# Patient Record
Sex: MALE | Race: WHITE | Employment: FULL TIME | URBAN - METROPOLITAN AREA
[De-identification: names, ages, dates, MRNs, and addresses within clinical notes are randomized per-mention and may not be internally consistent; named-entity substitution may affect disease eponyms.]

---

## 2018-09-21 PROBLEM — Z99.89 OSA ON CPAP: Status: ACTIVE | Noted: 2018-09-21

## 2018-09-21 PROBLEM — G47.33 OSA ON CPAP: Status: ACTIVE | Noted: 2018-09-21

## 2018-10-15 ENCOUNTER — OFFICE VISIT (OUTPATIENT)
Dept: FAMILY MEDICINE CLINIC | Age: 53
End: 2018-10-15
Payer: COMMERCIAL

## 2018-10-15 VITALS
HEIGHT: 71 IN | WEIGHT: 302 LBS | RESPIRATION RATE: 14 BRPM | SYSTOLIC BLOOD PRESSURE: 136 MMHG | OXYGEN SATURATION: 98 % | DIASTOLIC BLOOD PRESSURE: 78 MMHG | BODY MASS INDEX: 42.28 KG/M2 | HEART RATE: 72 BPM

## 2018-10-15 DIAGNOSIS — M25.512 ACUTE PAIN OF LEFT SHOULDER: Primary | ICD-10-CM

## 2018-10-15 PROCEDURE — 99202 OFFICE O/P NEW SF 15 MIN: CPT | Performed by: NURSE PRACTITIONER

## 2018-10-15 ASSESSMENT — PATIENT HEALTH QUESTIONNAIRE - PHQ9
SUM OF ALL RESPONSES TO PHQ9 QUESTIONS 1 & 2: 0
SUM OF ALL RESPONSES TO PHQ QUESTIONS 1-9: 0
SUM OF ALL RESPONSES TO PHQ QUESTIONS 1-9: 0
1. LITTLE INTEREST OR PLEASURE IN DOING THINGS: 0
2. FEELING DOWN, DEPRESSED OR HOPELESS: 0

## 2018-10-15 NOTE — PROGRESS NOTES
Oval Hugh   52 y.o.  male  [id number]      Chief Complaint   Patient presents with    Shoulder Pain     L; \"should popped out while doing yard work, popped itself back in\"        Subjective:  52 y. o.male is here for a follow up. He has the following chronic/acute medical problems:  Patient Active Problem List   Diagnosis    VENITA on CPAP     Patient is here today with left shoulder pain. Patient states he was working around the house. He took a branch and through it in a pile and his shoulder went out of socket. He states the shoulder went back in the socket but he is not sure if it went back in all the way. He is still having pain and was told to come here to get an ortho referral.           Review of Systems   Constitutional: Negative for appetite change, chills, fatigue and fever. HENT: Negative. Respiratory: Negative. Cardiovascular: Negative for chest pain and palpitations. Gastrointestinal: Negative for diarrhea, nausea and vomiting. Skin: Negative for rash. Neurological: Negative for dizziness, light-headedness and headaches. Current Outpatient Prescriptions   Medication Sig Dispense Refill    fexofenadine (ALLEGRA) 180 MG tablet Take by mouth      Budesonide-Formoterol Fumarate (SYMBICORT IN) Inhale into the lungs      Dextromethorphan-Guaifenesin (MUCINEX DM PO) Take  by mouth.  Dextromethorphan-Guaifenesin (GUAIFENESIN DM PO) Take  by mouth.  Fluticasone-Salmeterol (ADVAIR DISKUS IN) Inhale  into the lungs.  losartan (COZAAR) 100 MG tablet Take 100 mg by mouth daily.  fenofibrate 160 MG tablet Take 160 mg by mouth daily.  montelukast (SINGULAIR) 10 MG tablet Take 10 mg by mouth nightly.  terazosin (HYTRIN) 10 MG capsule Take 10 mg by mouth nightly.  albuterol (PROVENTIL HFA) 108 (90 BASE) MCG/ACT inhaler Inhale 2 puffs into the lungs every 6 hours as needed for Wheezing. 1 Inhaler 0     No current facility-administered medications for this visit.

## 2018-10-16 ASSESSMENT — ENCOUNTER SYMPTOMS
NAUSEA: 0
VOMITING: 0
RESPIRATORY NEGATIVE: 1
DIARRHEA: 0

## 2018-10-22 ENCOUNTER — OFFICE VISIT (OUTPATIENT)
Dept: ORTHOPEDIC SURGERY | Age: 53
End: 2018-10-22

## 2018-10-22 VITALS — WEIGHT: 297 LBS | HEIGHT: 71 IN | BODY MASS INDEX: 41.58 KG/M2 | RESPIRATION RATE: 16 BRPM

## 2018-10-22 DIAGNOSIS — S46.012A ROTATOR CUFF STRAIN, LEFT, INITIAL ENCOUNTER: Primary | ICD-10-CM

## 2018-10-22 DIAGNOSIS — R52 PAIN: ICD-10-CM

## 2018-10-22 PROCEDURE — 99203 OFFICE O/P NEW LOW 30 MIN: CPT | Performed by: PHYSICIAN ASSISTANT

## 2018-10-22 ASSESSMENT — ENCOUNTER SYMPTOMS: BACK PAIN: 1

## 2018-10-22 NOTE — PATIENT INSTRUCTIONS
Physical Therapy for rotator cuff strain   Naproxen and Tylenol   OK to return to work   Follow up with persistent pain

## 2018-10-22 NOTE — PROGRESS NOTES
Review of Systems   Respiratory: Negative for chest tightness. Cardiovascular: Negative for chest pain. Musculoskeletal: Positive for arthralgias, back pain and myalgias. Negative for neck pain and neck stiffness. Neurological: Negative for numbness. All other systems reviewed and are negative. Zenaida Colmenares is a 52y.o. year old male who complains of aching, sharp Left Shoulder pain that he rates at a 6/10. He states that he was doing yard work and pulling branches down and felt a sharp pull in his left shoulder in the biceps area and had immediate pain. Initially he was not able to move the shoulder very much without extreme pain however it is improving. He does continue to have some pain in the anterior biceps and bicipital groove. Lifting overhead or twisting the arm increases his pain, time and rest has improved his pain. He has never had an injury to this shoulder in the past and has never had surgery on the shoulder in the past.  He has taken some Tylenol for the pain. He has injured his right arm in the past and had a biceps tendon repair on the distal biceps. Past Medical History:   Diagnosis Date    Chronic bronchitis ([address]        Past Surgical History:   Procedure Laterality Date    BICEPS TENDON REPAIR         No family history on file.     Social History     Social History    Marital status:      Spouse name: N/A    Number of children: N/A    Years of education: N/A     Social History Main Topics    Smoking status: Never Smoker    Smokeless tobacco: Never Used    Alcohol use No    Drug use: No    Sexual activity: Not Asked     Other Topics Concern    None     Social History Narrative    None       Current Outpatient Prescriptions   Medication Sig Dispense Refill    fexofenadine (ALLEGRA) 180 MG tablet Take by mouth      Budesonide-Formoterol Fumarate (SYMBICORT IN) Inhale into the lungs      Dextromethorphan-Guaifenesin (MUCINEX DM PO) Take  by

## 2018-10-26 ASSESSMENT — ENCOUNTER SYMPTOMS: CHEST TIGHTNESS: 0

## 2018-11-06 ENCOUNTER — HOSPITAL ENCOUNTER (OUTPATIENT)
Dept: PHYSICAL THERAPY | Age: 53
Setting detail: THERAPIES SERIES
Discharge: HOME | End: 2018-11-06
Payer: COMMERCIAL

## 2018-11-06 PROCEDURE — 97110 THERAPEUTIC EXERCISES: CPT

## 2018-11-06 PROCEDURE — G8984 CARRY CURRENT STATUS: HCPCS

## 2018-11-06 PROCEDURE — 97161 PT EVAL LOW COMPLEX 20 MIN: CPT

## 2018-11-06 PROCEDURE — G8985 CARRY GOAL STATUS: HCPCS

## 2018-11-06 ASSESSMENT — PAIN DESCRIPTION - PAIN TYPE: TYPE: ACUTE PAIN

## 2018-11-06 ASSESSMENT — PAIN DESCRIPTION - ONSET: ONSET: SUDDEN

## 2018-11-06 ASSESSMENT — PAIN DESCRIPTION - LOCATION: LOCATION: SHOULDER

## 2018-11-06 ASSESSMENT — PAIN DESCRIPTION - DESCRIPTORS: DESCRIPTORS: DULL;THROBBING

## 2018-11-06 ASSESSMENT — PAIN SCALES - GENERAL: PAINLEVEL_OUTOF10: 6

## 2018-11-06 ASSESSMENT — PAIN DESCRIPTION - FREQUENCY: FREQUENCY: CONTINUOUS

## 2018-11-06 ASSESSMENT — PAIN DESCRIPTION - ORIENTATION: ORIENTATION: LEFT;ANTERIOR

## 2018-11-06 ASSESSMENT — PAIN DESCRIPTION - PROGRESSION: CLINICAL_PROGRESSION: GRADUALLY IMPROVING

## 2018-11-06 NOTE — PLAN OF CARE
Signature:________________________________Date:_________ TIME: _____  By signing above, therapists plan is approved by physician

## 2018-11-06 NOTE — FLOWSHEET NOTE
Outpatient Physical Therapy           [place]           [x] Phone: [phone]   Fax: [phone]  [facility]           [] Phone: [phone]   Fax: [phone]    Physical Therapy Daily Treatment Note  Date:  2018    Patient Name:  Scotty De    :  1965  MRN: [id number]  Restrictions/Precautions: Other position/activity restrictions: None  Diagnosis:   Diagnosis: RTC Strain  Date of Surgery:   Treatment Diagnosis:  L shoulder pain and decreased ROM    Insurance/Certification information: Drew   Referring Physician:  Deysi Burgess  Next Doctor Visit:    Plan of care signed (Y/N):   N  Visit# / total visits:     Pain level: /10   Goals:          Long term goals  Time Frame for Long term goals : 6 weeks  Long term goal 1: Pt will report no feelings of instability in the past 6 weeks  Long term goal 2: Pt will improve Quick DASH score to </=15%  Long term goal 3: Pt will improve L abd and IR AROM to 145 and T10, respectively  Long term goal 4: Pt will be able to pull 30 lbs straight down to simulate pulling a wire at work         Subjective:  See eval       Any changes in Ambulatory Summary Sheet? See eval      Objective:  See eval     Exercises:  Exercise/Equipment 18 Date Date Date            Warm-up                       TABLE         TE         SP 3x15 4#      Concentric circles 20x ea 4#               NR         Rhythmic stabilization                          STANDING         TE         Cane abd 2x10      Isometrics flx/ext/abd/IR/ER 10x ea 5\" hold      Biceps eccentric       IR/ER/ext with TB                TA                                NR                                Other Therapeutic Activities/Education:  Patient received education on their current pathology and how their condition effects them with their functional activities. Patient understood discussion and questions were answered.  Patient understands their activity limitations and understands rational for treatment

## 2018-11-06 NOTE — PLAN OF CARE
Excellent [] Good [x] Fair  [] Poor     Goals:      Long term goals  Time Frame for Long term goals : 6 weeks  Long term goal 1: Pt will report no feelings of instability in the past 6 weeks  Long term goal 2: Pt will improve Quick DASH score to </=15%  Long term goal 3: Pt will improve L abd and IR AROM to 145 and T10, respectively  Long term goal 4: Pt will be able to pull 30 lbs straight down to simulate pulling a wire at work    G-Code Selection: (On Eval and every 10th visit or Discharge)  MEASURE  [] Mobility: Walking and Moving Around     [] Current ()   [] Goal ()   [] DC ()  [] Changing/Maintaining Body Position     [] Current (8981)      [] Goal ()   [] DC ()  [x] Carrying / Moving / Handling Objects     [x] Current ()   [x] Goal ()   [] DC ()  [] Self-Care     [] Current ()   [] Goal ()   [] DC ()  [] Other PT/OT primary DX     [] Current ()   [] Goal ()   [] DC ()    SEVERITY  CURRENT  GOAL  DISCHARGE   [] CH (0% Impaired, Indep.)  [] CI (1-19% Impaired, SBA-CGA)  [] CJ (20-39% Impaired, MIN A)  [x] CK  (40-59% Impairment, Mod A)  [] CL  (60-79% Impairment, Max A)  [] CM  (80-99% Impairment, Dep.)   [] CN  (100% Impairment, Tot Dep.) [] CH (0% Impaired, Indep.)  [x] CI (1-19% Impaired, SBA-CGA)  [] CJ (20-39% Impaired, MIN A)  [] CK  (40-59% Impairment, Mod A)  [] CL  (60-79% Impairment, Max A)  [] CM  (80-99% Impairment, Dep.)   [] CN  (100% Impairment, Tot Dep.)  [] CH (0% Impaired, Indep.)  [] CI (1-19% Impaired, SBA-CGA)  [] CJ (20-39% Impaired, MIN A)  [] CK  (40-59% Impairment, Mod A)  [] CL  (60-79% Impairment, Max A)  [] CM  (80-99% Impairment, Dep.)   [] CN  (100% Impairment, Tot Dep.)          Electronically signed by:  Machelle Collins, PT, 11/6/2018, 5:36 PM        If you have any questions or concerns, please don't hesitate to call.   Thank you for your referral.      Physician Signature:________________________________Date:_________ TIME: _____  By signing above, therapists plan is approved by physician

## 2018-11-06 NOTE — PROGRESS NOTES
2  Plan weeks: 6  Specific instructions for Next Treatment: Shoulder strengthening and stability, increase ROM  Current Treatment Recommendations: Strengthening, ROM, Neuromuscular Re-education, Home Exercise Program    G-Code  PT G-Codes  Functional Assessment Tool Used: Quick DASH  Score: 37==59%  Functional Limitation: Carrying, moving and handling objects  Carrying, Moving and Handling Objects Current Status (): At least 40 percent but less than 60 percent impaired, limited or restricted  Carrying, Moving and Handling Objects Goal Status ():  At least 1 percent but less than 20 percent impaired, limited or restricted    Goals  Long term goals  Time Frame for Long term goals : 6 weeks  Long term goal 1: Pt will report no feelings of instability in the past 6 weeks  Long term goal 2: Pt will improve Quick DASH score to </=15%  Long term goal 3: Pt will improve L abd and IR AROM to 145 and T10, respectively  Long term goal 4: Pt will be able to pull 30 lbs straight down to simulate pulling a wire at work     Lalitha Kenney, SPT

## 2018-11-08 ENCOUNTER — HOSPITAL ENCOUNTER (OUTPATIENT)
Dept: PHYSICAL THERAPY | Age: 53
Setting detail: THERAPIES SERIES
Discharge: HOME | End: 2018-11-08
Payer: COMMERCIAL

## 2018-11-08 PROCEDURE — 97110 THERAPEUTIC EXERCISES: CPT

## 2018-11-08 NOTE — FLOWSHEET NOTE
Outpatient Physical Therapy           [place]           [x] Phone: [phone]   Fax: [phone]  [facility]           [] Phone: [phone]   Fax: [phone]    Physical Therapy Daily Treatment Note  Date:  2018    Patient Name:  Мария Conteh    :  1965  MRN: [id number]  Restrictions/Precautions: Other position/activity restrictions: None  Diagnosis:   Diagnosis: RTC Strain  Date of Surgery:   Treatment Diagnosis:  L shoulder pain and decreased ROM    Insurance/Certification information: Drew   Referring Physician:  Greyson Nath  Next Doctor Visit:    Plan of care signed (Y/N):   N  Visit# / total visits:     Pain level: 6/10   Goals:          Long term goals  Time Frame for Long term goals : 6 weeks  Long term goal 1: Pt will report no feelings of instability in the past 6 weeks  Long term goal 2: Pt will improve Quick DASH score to </=15%  Long term goal 3: Pt will improve L abd and IR AROM to 145 and T10, respectively  Long term goal 4: Pt will be able to pull 30 lbs straight down to simulate pulling a wire at work         Subjective:   Pt is a little sore today, took a few tylenol before treatment, still feeling some residual soreness from prior treatment. Reports feeling  \"looser\" after therapy session today and was asking about adding additional exercises to  His HEP. Any changes in Ambulatory Summary Sheet? See eval      Objective:  Pt. Present with painful active motion in horizontal  abduction / adduction and ER of the shoulder.      Exercises:  Exercise/Equipment 18 Date Date            Warm-up                       TABLE         TE         SP 3x15 4# 3x15 4#     Concentric circles 20x ea 4# 2x20 ea 4#              NR         Rhythmic stabilization  2x30' flexion / extension only,    Pain with HZ ab/ad                        STANDING         TE         Cane abd 2x10 2x10     Isometrics flx/ext/abd/IR/ER 10x ea 5\" hold 10x ea 5\" hold     Biceps eccentric IR/ER/ext with TB  ER x10 5\" holds  IR x10 5\" holds              TA                                NR                                Other Therapeutic Activities/Education:    Home Exercise Program:  Cane abd, isometrics flx/ext/IR/ER/abd, SP, concentric circles    Modality/intervention planned:    [x] Therapeutic Exercise  [] Modalities:  [x] Therapeutic Activity     [] Ultrasound  [] Elec  Stim  [] Gait Training      [] Cervical Traction [] Lumbar Traction  [x] Neuromuscular Re-education    [] Cold/hotpack [] Iontophoresis   [x] Instruction in HEP      [] Vasopneumatic     [] Manual Therapy               [] Aquatic Therapy     Manual Treatments:  None    Modalities:  None    Communication with other providers:  poc sent to referring provider    Education provided to patient/caregiver:  Pt educated on poc, hep, pathology, if worsening symptoms to d/c that exercise. Adverse reactions to treatment:  None    Equipment provided:  None    Assessment:  Pt. Arrived at therapy and was feeling sore, most of his pain occurs with ER and horizontal abduction of his L shoulder. Pt shows good strength overall but will continue to benefit from skilled oversight to manage the progression of his healing and selection of appropriate and functional exercises. Time In / Time Out:     1725/ 1750:                  Timed Code/Total Treatment Minutes:  25: TE (2) 25.       Patients Report of Tolerance:    [] Patient limited by fatigue        [] Patient limited by pain   [] Patient limited by other medical complications   [x] Other: pt tolerated tx well    Prognosis:   [] Good [x] Fair  [] Poor    Plan:   [] Continue per plan of care [] Alter current plan (see comments)  [x] Plan of care initiated [] Hold pending MD visit [] Discharge    Plan for Next Session: Shoulder stability strengthening, increase ROM       Electronically signed by:  Soha Reeder, ROSEANN   11/8/2018, 8:30 AM

## 2018-11-12 ENCOUNTER — HOSPITAL ENCOUNTER (OUTPATIENT)
Dept: PHYSICAL THERAPY | Age: 53
Setting detail: THERAPIES SERIES
Discharge: HOME | End: 2018-11-12
Payer: COMMERCIAL

## 2018-11-14 ENCOUNTER — HOSPITAL ENCOUNTER (OUTPATIENT)
Dept: PHYSICAL THERAPY | Age: 53
Setting detail: THERAPIES SERIES
Discharge: HOME | End: 2018-11-14
Payer: COMMERCIAL

## 2018-11-14 PROCEDURE — 97110 THERAPEUTIC EXERCISES: CPT

## 2018-11-14 PROCEDURE — 97016 VASOPNEUMATIC DEVICE THERAPY: CPT

## 2018-11-19 ENCOUNTER — HOSPITAL ENCOUNTER (OUTPATIENT)
Dept: GENERAL RADIOLOGY | Age: 53
Discharge: HOME | End: 2018-11-19
Payer: COMMERCIAL

## 2018-11-19 ENCOUNTER — HOSPITAL ENCOUNTER (OUTPATIENT)
Age: 53
Discharge: HOME | End: 2018-11-19
Payer: COMMERCIAL

## 2018-11-19 DIAGNOSIS — Z99.89 OSA ON CPAP: ICD-10-CM

## 2018-11-19 DIAGNOSIS — G47.33 OSA ON CPAP: ICD-10-CM

## 2018-11-19 PROCEDURE — 71046 X-RAY EXAM CHEST 2 VIEWS: CPT

## 2018-11-21 ENCOUNTER — HOSPITAL ENCOUNTER (OUTPATIENT)
Dept: PHYSICAL THERAPY | Age: 53
Setting detail: THERAPIES SERIES
Discharge: HOME | End: 2018-11-21
Payer: COMMERCIAL

## 2018-11-21 PROCEDURE — 97016 VASOPNEUMATIC DEVICE THERAPY: CPT

## 2018-11-21 PROCEDURE — 97112 NEUROMUSCULAR REEDUCATION: CPT

## 2018-11-21 PROCEDURE — 97110 THERAPEUTIC EXERCISES: CPT

## 2018-11-28 ENCOUNTER — HOSPITAL ENCOUNTER (OUTPATIENT)
Dept: PHYSICAL THERAPY | Age: 53
Setting detail: THERAPIES SERIES
Discharge: HOME | End: 2018-11-28
Payer: COMMERCIAL

## 2018-11-28 PROCEDURE — 97016 VASOPNEUMATIC DEVICE THERAPY: CPT

## 2018-11-28 PROCEDURE — 97110 THERAPEUTIC EXERCISES: CPT

## 2018-12-05 ENCOUNTER — HOSPITAL ENCOUNTER (OUTPATIENT)
Dept: PHYSICAL THERAPY | Age: 53
Setting detail: THERAPIES SERIES
Discharge: HOME | End: 2018-12-05
Payer: COMMERCIAL

## 2018-12-05 PROCEDURE — 97530 THERAPEUTIC ACTIVITIES: CPT

## 2018-12-05 PROCEDURE — 97110 THERAPEUTIC EXERCISES: CPT

## 2018-12-05 PROCEDURE — 97016 VASOPNEUMATIC DEVICE THERAPY: CPT

## 2018-12-10 ENCOUNTER — HOSPITAL ENCOUNTER (OUTPATIENT)
Dept: PHYSICAL THERAPY | Age: 53
Setting detail: THERAPIES SERIES
Discharge: HOME | End: 2018-12-10
Payer: COMMERCIAL

## 2018-12-12 ENCOUNTER — HOSPITAL ENCOUNTER (OUTPATIENT)
Dept: PHYSICAL THERAPY | Age: 53
Setting detail: THERAPIES SERIES
Discharge: HOME | End: 2018-12-12
Payer: COMMERCIAL

## 2018-12-13 NOTE — FLOWSHEET NOTE
Patient called and left voice mail to [facility]. Having issues and will be seeing the doctor next week.

## 2018-12-17 ENCOUNTER — OFFICE VISIT (OUTPATIENT)
Dept: ORTHOPEDIC SURGERY | Age: 53
End: 2018-12-17
Payer: COMMERCIAL

## 2018-12-17 ENCOUNTER — HOSPITAL ENCOUNTER (OUTPATIENT)
Dept: PHYSICAL THERAPY | Age: 53
Discharge: HOME | End: 2018-12-17

## 2018-12-17 VITALS — BODY MASS INDEX: 41.72 KG/M2 | RESPIRATION RATE: 14 BRPM | HEIGHT: 71 IN | WEIGHT: 298 LBS

## 2018-12-17 DIAGNOSIS — S46.112A STRAIN OF MUSCLE, FASCIA AND TENDON OF LONG HEAD OF BICEPS, LEFT ARM, INITIAL ENCOUNTER: ICD-10-CM

## 2018-12-17 DIAGNOSIS — S46.012A STRAIN OF LEFT ROTATOR CUFF CAPSULE, INITIAL ENCOUNTER: Primary | ICD-10-CM

## 2018-12-17 PROCEDURE — 99213 OFFICE O/P EST LOW 20 MIN: CPT | Performed by: PHYSICIAN ASSISTANT

## 2018-12-17 RX ORDER — TESTOSTERONE CYPIONATE 200 MG/ML
INJECTION INTRAMUSCULAR
Refills: 3 | COMMUNITY
Start: 2018-11-01

## 2018-12-17 RX ORDER — ATENOLOL 50 MG/1
TABLET ORAL
Refills: 4 | COMMUNITY
Start: 2018-11-28

## 2018-12-27 ENCOUNTER — TELEPHONE (OUTPATIENT)
Dept: ORTHOPEDIC SURGERY | Age: 53
End: 2018-12-27

## 2019-01-03 ENCOUNTER — TELEPHONE (OUTPATIENT)
Dept: ORTHOPEDIC SURGERY | Age: 54
End: 2019-01-03

## 2019-01-08 ASSESSMENT — ENCOUNTER SYMPTOMS
GASTROINTESTINAL NEGATIVE: 1
RESPIRATORY NEGATIVE: 1
EYES NEGATIVE: 1

## 2019-01-09 ENCOUNTER — OFFICE VISIT (OUTPATIENT)
Dept: ORTHOPEDIC SURGERY | Age: 54
End: 2019-01-09
Payer: COMMERCIAL

## 2019-01-09 VITALS — BODY MASS INDEX: 42 KG/M2 | RESPIRATION RATE: 16 BRPM | WEIGHT: 300 LBS | HEIGHT: 71 IN

## 2019-01-09 DIAGNOSIS — S83.412A SPRAIN OF MEDIAL COLLATERAL LIGAMENT OF LEFT KNEE, INITIAL ENCOUNTER: ICD-10-CM

## 2019-01-09 DIAGNOSIS — S46.212A RUPTURE OF LEFT PROXIMAL BICEPS TENDON, INITIAL ENCOUNTER: Primary | ICD-10-CM

## 2019-01-09 PROCEDURE — 99213 OFFICE O/P EST LOW 20 MIN: CPT | Performed by: PHYSICIAN ASSISTANT

## 2019-01-09 ASSESSMENT — ENCOUNTER SYMPTOMS
GASTROINTESTINAL NEGATIVE: 1
APNEA: 1
WHEEZING: 1
SHORTNESS OF BREATH: 1
EYES NEGATIVE: 1